# Patient Record
Sex: FEMALE | Race: WHITE
[De-identification: names, ages, dates, MRNs, and addresses within clinical notes are randomized per-mention and may not be internally consistent; named-entity substitution may affect disease eponyms.]

---

## 2016-12-23 VITALS
SYSTOLIC BLOOD PRESSURE: 120 MMHG | SYSTOLIC BLOOD PRESSURE: 120 MMHG | DIASTOLIC BLOOD PRESSURE: 64 MMHG | DIASTOLIC BLOOD PRESSURE: 64 MMHG

## 2021-09-14 ENCOUNTER — HOSPITAL ENCOUNTER (OUTPATIENT)
Dept: HOSPITAL 61 - KCIC MAMMO | Age: 71
End: 2021-09-14
Attending: FAMILY MEDICINE
Payer: COMMERCIAL

## 2021-09-14 DIAGNOSIS — M81.0: ICD-10-CM

## 2021-09-14 DIAGNOSIS — M85.89: ICD-10-CM

## 2021-09-14 DIAGNOSIS — Z12.31: Primary | ICD-10-CM

## 2021-09-14 PROCEDURE — 77080 DXA BONE DENSITY AXIAL: CPT

## 2021-09-14 PROCEDURE — 77067 SCR MAMMO BI INCL CAD: CPT

## 2021-09-14 PROCEDURE — 77063 BREAST TOMOSYNTHESIS BI: CPT

## 2021-09-14 NOTE — [facility]
INDICATION: Screening for osteopenia/osteoporosis.  Reason: OSTEOPENIA MULTIPLE SITES / Spl. Instruct
ions:  / History: 



COMPARISON: 6/1/2015



TECHNIQUE:  Bone densitometry was performed through the lumbar spine and proximal femur.



IMPRESSION: 



Lumbar Spine: 

BMD: 1.0 

T-Score: -0.2

Range: Normal. Increased by 5 percent from prior. 



Proximal Femur:

BMD: 0.68

T-Score: -2.2

Range: Near the border of osteopenia and osteoporosis. Decreased by 10 percent from prior. 







World Health Organization Criteria for Bone Density:



T-Score:

> -1.0: Normal Range

< -1.0 to -2.5:  Osteopenic Range

< -2.5: Osteoporotic Range



Electronically signed by: Mk Ferrer MD (9/14/2021 1:11 PM) [id number]

## 2021-09-14 NOTE — [facility]
Bilateral digital screening mammograms with 3-D tomosynthesis:



Reason for examination: Routine screening.



Comparison is made to previous study dated 4/29/2016.



Bilateral mammograms in CC and oblique projections were obtained with 2-D imaging and 3-D tomosynthes
is imaging on a Siemens Inspiration unit and reviewed on the workstation. Interpretation was made cookie ospina the benefit of CAD.



The skin and nipples show no abnormalities. No abnormal axillary lymph nodes are seen. The breast par
enchyma is heterogeneously dense. (Breast density: Category C.) There continues these nodules in the 
subareolar positions, especially on the right which is stable. There are no new dominant masses, susp
icious calcifications or architectural distortion. Benign calcifications are present.



Impression:



No evidence of malignancy. Recommend routine screening.



Your patient's mammogram demonstrates that she has dense breast tissue (breast density category C or 
D), which could hide abnormalities, and if she has other risk factors for breast cancer that have bee
n identified, she might benefit from supplemental screening tests that may be suggested by you as her
 ordering physician. Dense breast tissue, in and of itself, is a relatively common condition. Therefo
re, this information is not provided to cause undue concern, but rather to raise your awareness and t
o promote discussion with your patient regarding the presence of other risk factors, in addition to d
ense breast tissue. Your patient's mammography results will be sent to her.



BI-RAD Category 2: Benign.



"Our facility is accredited by the [employer] Mammography Program."



This patient's information has been entered into a reminder system for the patient to be notified wit
h the results of her examination and a target date for the next mammogram.



Electronically signed by: Nataliya Clemente MD (9/14/2021 3:12 PM) [id number]

## 2022-01-13 ENCOUNTER — HOSPITAL ENCOUNTER (OUTPATIENT)
Dept: HOSPITAL 61 - KCIC | Age: 72
End: 2022-01-13
Attending: FAMILY MEDICINE
Payer: MEDICARE

## 2022-01-13 DIAGNOSIS — M48.8X7: ICD-10-CM

## 2022-01-13 DIAGNOSIS — M41.84: ICD-10-CM

## 2022-01-13 DIAGNOSIS — M43.8X4: ICD-10-CM

## 2022-01-13 DIAGNOSIS — M47.814: ICD-10-CM

## 2022-01-13 DIAGNOSIS — M47.816: Primary | ICD-10-CM

## 2022-01-13 DIAGNOSIS — M19.031: ICD-10-CM

## 2022-01-13 DIAGNOSIS — M43.16: ICD-10-CM

## 2022-01-13 DIAGNOSIS — M47.812: ICD-10-CM

## 2022-01-13 DIAGNOSIS — M50.323: ICD-10-CM

## 2022-01-13 DIAGNOSIS — M85.89: ICD-10-CM

## 2022-01-13 PROCEDURE — 73110 X-RAY EXAM OF WRIST: CPT

## 2022-01-13 PROCEDURE — 72100 X-RAY EXAM L-S SPINE 2/3 VWS: CPT

## 2022-01-13 PROCEDURE — 72040 X-RAY EXAM NECK SPINE 2-3 VW: CPT

## 2022-01-13 PROCEDURE — 72072 X-RAY EXAM THORAC SPINE 3VWS: CPT

## 2022-01-13 NOTE — [facility]
EXAM: Cervical spine, 3 views; thoracic spine, 3 views; lumbar spine, 3 views; right wrist, 3 views.



HISTORY: Pain. Fall.



COMPARISON: None.



FINDINGS: 



Cervical spine: 3 views of the cervical spine are obtained. There is instrumented anterior spinal fus
ion and interbody fusion at C5-C6. There is minimal degenerative anterolisthesis of C3 on C4. There i
s severe degenerative endplate remodeling with disc space narrowing at C6-C7. There is multilevel fac
et arthropathy. There is bone demineralization. There is no acute osseous finding.



Thoracic spine: 3 views of the thoracic spine are obtained. There is mild S-shaped thoracic scoliosis
. There is a chronic mild left lateral wedge compression deformity of T6 and a chronic mild right lat
eral wedge compression deformity of T8. There is multilevel endplate remodeling. There is bone demine
ralization.



Lumbar spine: 3 views of the lumbar spine are obtained. There is a transitional thoracolumbar segment
. Based on the presence of 12 rib-bearing thoracic segments, this is considered L1. There are hypopla
stic L1 ribs or elongated congenitally nonfused L1 transverse processes. There is grade 1 anterolisth
esis of L5 on L6, and to a lesser extent, L4 on L5. There is multilevel endplate remodeling and facet
 arthropathy, primarily at L5-S1. There is bone demineralization. There is an incidental calcified ut
erine fibroid.



Right wrist: 3 views of the right wrist are obtained. There is triscaphe and first carpometacarpal elisabet
int space narrowing and subchondral sclerosis. There is ulnar negative variance. There is lucency tra
versing the triquetrum and a single projection, possibly due to a nondisplaced fracture. There is bon
e demineralization.



IMPRESSION: 

1. Lucency traversing the right triquetrum, possibly due to a nondisplaced fracture. Correlate for po
int tenderness in this location along the dorsal aspect of the wrist.

2. Multilevel degenerative change involving the cervical, thoracic and lumbar spine, described in det
ail above.

3. Instrumented fusion at C5-C6.

4. Transitional thoracolumbar segment, a normal variant described above.

5. Mild right wrist osteoarthritis.

6. Bone demineralization.



Electronically signed by: Nazia Moura MD (1/13/2022 11:37 AM) [id number]

## 2022-01-13 NOTE — [facility]
EXAM: Cervical spine, 3 views; thoracic spine, 3 views; lumbar spine, 3 views; right wrist, 3 views.



HISTORY: Pain. Fall.



COMPARISON: None.



FINDINGS: 



Cervical spine: 3 views of the cervical spine are obtained. There is instrumented anterior spinal fus
ion and interbody fusion at C5-C6. There is minimal degenerative anterolisthesis of C3 on C4. There i
s severe degenerative endplate remodeling with disc space narrowing at C6-C7. There is multilevel fac
et arthropathy. There is bone demineralization. There is no acute osseous finding.



Thoracic spine: 3 views of the thoracic spine are obtained. There is mild S-shaped thoracic scoliosis
. There is a chronic mild left lateral wedge compression deformity of T6 and a chronic mild right lat
eral wedge compression deformity of T8. There is multilevel endplate remodeling. There is bone demine
ralization.



Lumbar spine: 3 views of the lumbar spine are obtained. There is a transitional thoracolumbar segment
. Based on the presence of 12 rib-bearing thoracic segments, this is considered L1. There are hypopla
stic L1 ribs or elongated congenitally nonfused L1 transverse processes. There is grade 1 anterolisth
esis of L5 on L6, and to a lesser extent, L4 on L5. There is multilevel endplate remodeling and facet
 arthropathy, primarily at L5-S1. There is bone demineralization. There is an incidental calcified ut
erine fibroid.



Right wrist: 3 views of the right wrist are obtained. There is triscaphe and first carpometacarpal elisabet
int space narrowing and subchondral sclerosis. There is ulnar negative variance. There is lucency tra
versing the triquetrum and a single projection, possibly due to a nondisplaced fracture. There is bon
e demineralization.



IMPRESSION: 

1. Lucency traversing the right triquetrum, possibly due to a nondisplaced fracture. Correlate for po
int tenderness in this location along the dorsal aspect of the wrist.

2. Multilevel degenerative change involving the cervical, thoracic and lumbar spine, described in det
ail above.

3. Instrumented fusion at C5-C6.

4. Transitional thoracolumbar segment, a normal variant described above.

5. Mild right wrist osteoarthritis.

6. Bone demineralization.



Electronically signed by: Nazia Moura MD (1/13/2022 11:37 AM) [id number]

## 2022-01-13 NOTE — [facility]
EXAM: Cervical spine, 3 views; thoracic spine, 3 views; lumbar spine, 3 views; right wrist, 3 views.



HISTORY: Pain. Fall.



COMPARISON: None.



FINDINGS: 



Cervical spine: 3 views of the cervical spine are obtained. There is instrumented anterior spinal fus
ion and interbody fusion at C5-C6. There is minimal degenerative anterolisthesis of C3 on C4. There i
s severe degenerative endplate remodeling with disc space narrowing at C6-C7. There is multilevel fac
et arthropathy. There is bone demineralization. There is no acute osseous finding.



Thoracic spine: 3 views of the thoracic spine are obtained. There is mild S-shaped thoracic scoliosis
. There is a chronic mild left lateral wedge compression deformity of T6 and a chronic mild right lat
eral wedge compression deformity of T8. There is multilevel endplate remodeling. There is bone demine
ralization.



Lumbar spine: 3 views of the lumbar spine are obtained. There is a transitional thoracolumbar segment
. Based on the presence of 12 rib-bearing thoracic segments, this is considered L1. There are hypopla
stic L1 ribs or elongated congenitally nonfused L1 transverse processes. There is grade 1 anterolisth
esis of L5 on L6, and to a lesser extent, L4 on L5. There is multilevel endplate remodeling and facet
 arthropathy, primarily at L5-S1. There is bone demineralization. There is an incidental calcified ut
erine fibroid.



Right wrist: 3 views of the right wrist are obtained. There is triscaphe and first carpometacarpal elisabet
int space narrowing and subchondral sclerosis. There is ulnar negative variance. There is lucency tra
versing the triquetrum and a single projection, possibly due to a nondisplaced fracture. There is bon
e demineralization.



IMPRESSION: 

1. Lucency traversing the right triquetrum, possibly due to a nondisplaced fracture. Correlate for po
int tenderness in this location along the dorsal aspect of the wrist.

2. Multilevel degenerative change involving the cervical, thoracic and lumbar spine, described in det
ail above.

3. Instrumented fusion at C5-C6.

4. Transitional thoracolumbar segment, a normal variant described above.

5. Mild right wrist osteoarthritis.

6. Bone demineralization.



Electronically signed by: Nazia Moura MD (1/13/2022 11:37 AM) [id number]

## 2022-01-13 NOTE — [facility]
EXAM: Cervical spine, 3 views; thoracic spine, 3 views; lumbar spine, 3 views; right wrist, 3 views.



HISTORY: Pain. Fall.



COMPARISON: None.



FINDINGS: 



Cervical spine: 3 views of the cervical spine are obtained. There is instrumented anterior spinal fus
ion and interbody fusion at C5-C6. There is minimal degenerative anterolisthesis of C3 on C4. There i
s severe degenerative endplate remodeling with disc space narrowing at C6-C7. There is multilevel fac
et arthropathy. There is bone demineralization. There is no acute osseous finding.



Thoracic spine: 3 views of the thoracic spine are obtained. There is mild S-shaped thoracic scoliosis
. There is a chronic mild left lateral wedge compression deformity of T6 and a chronic mild right lat
eral wedge compression deformity of T8. There is multilevel endplate remodeling. There is bone demine
ralization.



Lumbar spine: 3 views of the lumbar spine are obtained. There is a transitional thoracolumbar segment
. Based on the presence of 12 rib-bearing thoracic segments, this is considered L1. There are hypopla
stic L1 ribs or elongated congenitally nonfused L1 transverse processes. There is grade 1 anterolisth
esis of L5 on L6, and to a lesser extent, L4 on L5. There is multilevel endplate remodeling and facet
 arthropathy, primarily at L5-S1. There is bone demineralization. There is an incidental calcified ut
erine fibroid.



Right wrist: 3 views of the right wrist are obtained. There is triscaphe and first carpometacarpal elsiabet
int space narrowing and subchondral sclerosis. There is ulnar negative variance. There is lucency tra
versing the triquetrum and a single projection, possibly due to a nondisplaced fracture. There is bon
e demineralization.



IMPRESSION: 

1. Lucency traversing the right triquetrum, possibly due to a nondisplaced fracture. Correlate for po
int tenderness in this location along the dorsal aspect of the wrist.

2. Multilevel degenerative change involving the cervical, thoracic and lumbar spine, described in det
ail above.

3. Instrumented fusion at C5-C6.

4. Transitional thoracolumbar segment, a normal variant described above.

5. Mild right wrist osteoarthritis.

6. Bone demineralization.



Electronically signed by: Nazia Moura MD (1/13/2022 11:37 AM) [id number]